# Patient Record
Sex: MALE | Race: WHITE | NOT HISPANIC OR LATINO | Employment: OTHER | URBAN - METROPOLITAN AREA
[De-identification: names, ages, dates, MRNs, and addresses within clinical notes are randomized per-mention and may not be internally consistent; named-entity substitution may affect disease eponyms.]

---

## 2019-08-27 NOTE — PATIENT DISCUSSION
"""Discussed with pt. that she go and get the medication directly from the source to cut the cost of ""

## 2022-10-26 NOTE — PATIENT DISCUSSION
Discontinue Restasis. Start XIIDRA BID OU. Continue PF tears 3-4 times a day OU. Start gel drops qhs OU. Start warm compresses BID OU.

## 2022-10-26 NOTE — PATIENT DISCUSSION
Jane Notice: Patient counseled as to delayed onset effect of Pati Mosley. Discussed onset of action may take 2-3 months to achieve maximum effect. Reviewed with patient mild degree of irritation and burning with onset of usage. Patient verbalizes understanding and wishes to start medication. Will e-prescribe medication to patient's pharmacy and follow up to determine effect.

## 2022-12-09 ENCOUNTER — NEW PATIENT (OUTPATIENT)
Dept: URBAN - METROPOLITAN AREA CLINIC 26 | Facility: CLINIC | Age: 72
End: 2022-12-09

## 2022-12-09 VITALS
HEIGHT: 78 IN | HEART RATE: 109 BPM | WEIGHT: 145 LBS | BODY MASS INDEX: 16.78 KG/M2 | DIASTOLIC BLOOD PRESSURE: 82 MMHG | SYSTOLIC BLOOD PRESSURE: 149 MMHG

## 2022-12-09 PROCEDURE — 92250 FUNDUS PHOTOGRAPHY W/I&R: CPT

## 2022-12-09 PROCEDURE — 99204 OFFICE O/P NEW MOD 45 MIN: CPT

## 2022-12-09 PROCEDURE — 92134 CPTRZ OPH DX IMG PST SGM RTA: CPT

## 2022-12-09 ASSESSMENT — TONOMETRY
OS_IOP_MMHG: 17
OD_IOP_MMHG: 15

## 2022-12-09 ASSESSMENT — VISUAL ACUITY
OD_SC: 20/200+2
OS_SC: 20/40-1

## 2023-01-19 NOTE — PATIENT DISCUSSION
Continue Restasis BID OU. Continue PF tears 3-4 times a day OU. Start gel drops qhs OU. Start warm compresses BID OU. Pt unable to use Xiidra due to insurance.

## 2023-04-14 ENCOUNTER — POST-OP (OUTPATIENT)
Dept: URBAN - METROPOLITAN AREA CLINIC 26 | Facility: CLINIC | Age: 73
End: 2023-04-14

## 2023-04-14 DIAGNOSIS — H35.341: ICD-10-CM

## 2023-04-14 DIAGNOSIS — H43.392: ICD-10-CM

## 2023-04-14 DIAGNOSIS — H04.123: ICD-10-CM

## 2023-04-14 DIAGNOSIS — H02.834: ICD-10-CM

## 2023-04-14 DIAGNOSIS — H02.831: ICD-10-CM

## 2023-04-14 DIAGNOSIS — H35.372: ICD-10-CM

## 2023-04-14 PROCEDURE — 99024 POSTOP FOLLOW-UP VISIT: CPT

## 2023-04-14 ASSESSMENT — VISUAL ACUITY
OS_SC: 20/25-2
OD_SC: 20/200-2

## 2023-04-14 ASSESSMENT — TONOMETRY
OS_IOP_MMHG: 13
OD_IOP_MMHG: 11

## 2023-05-19 ENCOUNTER — POST-OP (OUTPATIENT)
Dept: URBAN - METROPOLITAN AREA CLINIC 26 | Facility: CLINIC | Age: 73
End: 2023-05-19

## 2023-05-19 DIAGNOSIS — H43.392: ICD-10-CM

## 2023-05-19 DIAGNOSIS — H35.372: ICD-10-CM

## 2023-05-19 DIAGNOSIS — H02.834: ICD-10-CM

## 2023-05-19 DIAGNOSIS — H35.341: ICD-10-CM

## 2023-05-19 DIAGNOSIS — H02.831: ICD-10-CM

## 2023-05-19 DIAGNOSIS — H04.123: ICD-10-CM

## 2023-05-19 PROCEDURE — 99024 POSTOP FOLLOW-UP VISIT: CPT

## 2023-05-19 PROCEDURE — 92134 CPTRZ OPH DX IMG PST SGM RTA: CPT

## 2023-05-19 ASSESSMENT — VISUAL ACUITY
OD_PH: 20/40-2
OD_SC: 20/60+1
OS_SC: 20/25-2

## 2023-05-19 ASSESSMENT — TONOMETRY
OS_IOP_MMHG: 16
OD_IOP_MMHG: 15

## 2023-11-09 ENCOUNTER — FOLLOW UP (OUTPATIENT)
Dept: URBAN - METROPOLITAN AREA CLINIC 26 | Facility: CLINIC | Age: 73
End: 2023-11-09

## 2023-11-09 DIAGNOSIS — H35.351: ICD-10-CM

## 2023-11-09 DIAGNOSIS — H04.123: ICD-10-CM

## 2023-11-09 DIAGNOSIS — H35.372: ICD-10-CM

## 2023-11-09 DIAGNOSIS — H02.831: ICD-10-CM

## 2023-11-09 DIAGNOSIS — H02.834: ICD-10-CM

## 2023-11-09 DIAGNOSIS — H35.341: ICD-10-CM

## 2023-11-09 DIAGNOSIS — H43.392: ICD-10-CM

## 2023-11-09 PROCEDURE — 92134 CPTRZ OPH DX IMG PST SGM RTA: CPT

## 2023-11-09 PROCEDURE — 92250 FUNDUS PHOTOGRAPHY W/I&R: CPT

## 2023-11-09 PROCEDURE — 92014 COMPRE OPH EXAM EST PT 1/>: CPT

## 2023-11-09 RX ORDER — KETOROLAC TROMETHAMINE 5 MG/ML: 1 SOLUTION OPHTHALMIC TWICE A DAY

## 2023-11-09 RX ORDER — PREDNISOLONE ACETATE 10 MG/ML: 1 SUSPENSION/ DROPS OPHTHALMIC

## 2023-11-09 ASSESSMENT — VISUAL ACUITY
OD_SC: 20/60-2
OS_SC: 20/40

## 2023-11-09 ASSESSMENT — TONOMETRY
OS_IOP_MMHG: 20
OD_IOP_MMHG: 13

## 2023-12-21 ENCOUNTER — FOLLOW UP (OUTPATIENT)
Dept: URBAN - METROPOLITAN AREA CLINIC 26 | Facility: CLINIC | Age: 73
End: 2023-12-21

## 2023-12-21 DIAGNOSIS — H35.372: ICD-10-CM

## 2023-12-21 DIAGNOSIS — H02.831: ICD-10-CM

## 2023-12-21 DIAGNOSIS — H35.341: ICD-10-CM

## 2023-12-21 DIAGNOSIS — H43.392: ICD-10-CM

## 2023-12-21 DIAGNOSIS — H35.351: ICD-10-CM

## 2023-12-21 DIAGNOSIS — H04.123: ICD-10-CM

## 2023-12-21 DIAGNOSIS — H02.834: ICD-10-CM

## 2023-12-21 PROCEDURE — 92014 COMPRE OPH EXAM EST PT 1/>: CPT

## 2023-12-21 PROCEDURE — 92134 CPTRZ OPH DX IMG PST SGM RTA: CPT

## 2023-12-21 ASSESSMENT — TONOMETRY
OS_IOP_MMHG: 13
OD_IOP_MMHG: 14

## 2023-12-21 ASSESSMENT — VISUAL ACUITY
OD_SC: 20/50-2
OS_SC: 20/25
OD_PH: 20/40-1

## 2024-02-22 ENCOUNTER — FOLLOW UP (OUTPATIENT)
Dept: URBAN - METROPOLITAN AREA CLINIC 26 | Facility: CLINIC | Age: 74
End: 2024-02-22

## 2024-02-22 VITALS — HEIGHT: 66 IN | BODY MASS INDEX: 22.5 KG/M2 | WEIGHT: 140 LBS

## 2024-02-22 DIAGNOSIS — H02.834: ICD-10-CM

## 2024-02-22 DIAGNOSIS — H35.372: ICD-10-CM

## 2024-02-22 DIAGNOSIS — H04.123: ICD-10-CM

## 2024-02-22 DIAGNOSIS — H02.831: ICD-10-CM

## 2024-02-22 DIAGNOSIS — H35.351: ICD-10-CM

## 2024-02-22 DIAGNOSIS — H43.392: ICD-10-CM

## 2024-02-22 DIAGNOSIS — H35.341: ICD-10-CM

## 2024-02-22 PROCEDURE — 92134 CPTRZ OPH DX IMG PST SGM RTA: CPT

## 2024-02-22 PROCEDURE — 92014 COMPRE OPH EXAM EST PT 1/>: CPT

## 2024-02-22 ASSESSMENT — TONOMETRY
OD_IOP_MMHG: 14
OS_IOP_MMHG: 16

## 2024-02-22 ASSESSMENT — VISUAL ACUITY
OD_SC: 20/50+2
OS_SC: 20/40+2
